# Patient Record
(demographics unavailable — no encounter records)

---

## 2024-12-23 NOTE — HEALTH RISK ASSESSMENT
[Fair] :  ~his/her~ mood as fair [Yes] : Yes [Monthly or less (1 pt)] : Monthly or less (1 point) [No falls in past year] : Patient reported no falls in the past year [0] : 2) Feeling down, depressed, or hopeless: Not at all (0) [PHQ-2 Negative - No further assessment needed] : PHQ-2 Negative - No further assessment needed [[id number]] : 0 [Never] : Never [Patient reported mammogram was abnormal] : Patient reported mammogram was abnormal [Patient reported PAP Smear was normal] : Patient reported PAP Smear was normal [Patient reported colonoscopy was normal] : Patient reported colonoscopy was normal [HIV test declined] : HIV test declined [Hepatitis C test declined] : Hepatitis C test declined [Fully functional (bathing, dressing, toileting, transferring, walking, feeding)] : Fully functional (bathing, dressing, toileting, transferring, walking, feeding) [Fully functional (using the telephone, shopping, preparing meals, housekeeping, doing laundry, using] : Fully functional and needs no help or supervision to perform IADLs (using the telephone, shopping, preparing meals, housekeeping, doing laundry, using transportation, managing medications and managing finances) [MammogramDate] : 6/2024 [MammogramComments] : f/u 12/26/2024 [PapSmearDate] : 11/2024 [ColonoscopyDate] : 2022 [With Patient/Caregiver] : , with patient/caregiver [AdvancecareDate] : 12/2024

## 2024-12-23 NOTE — HISTORY OF PRESENT ILLNESS
[FreeTextEntry1] : HERE FOR Comprehensive annual examination  [de-identified] : diet- healthy exercise- walking, running- 1 hr 2-3 times/ wk. Has been in good overall health since his last CPE. h/o Diabetes mellitus, type 2 ,Anemia, Hyperlipidemia , vit d deficiency, OA, HTN, asthma